# Patient Record
Sex: FEMALE | Race: WHITE | NOT HISPANIC OR LATINO | Employment: UNEMPLOYED | URBAN - METROPOLITAN AREA
[De-identification: names, ages, dates, MRNs, and addresses within clinical notes are randomized per-mention and may not be internally consistent; named-entity substitution may affect disease eponyms.]

---

## 2017-01-09 ENCOUNTER — APPOINTMENT (OUTPATIENT)
Dept: MEDICAL GROUP | Facility: MEDICAL CENTER | Age: 32
End: 2017-01-09
Payer: COMMERCIAL

## 2017-03-03 ENCOUNTER — OFFICE VISIT (OUTPATIENT)
Dept: MEDICAL GROUP | Facility: MEDICAL CENTER | Age: 32
End: 2017-03-03
Payer: COMMERCIAL

## 2017-03-03 VITALS
TEMPERATURE: 98.9 F | HEIGHT: 70 IN | DIASTOLIC BLOOD PRESSURE: 72 MMHG | BODY MASS INDEX: 24.79 KG/M2 | SYSTOLIC BLOOD PRESSURE: 114 MMHG | OXYGEN SATURATION: 98 % | HEART RATE: 78 BPM | RESPIRATION RATE: 16 BRPM | WEIGHT: 173.2 LBS

## 2017-03-03 DIAGNOSIS — J06.9 ACUTE URI: ICD-10-CM

## 2017-03-03 PROCEDURE — 99214 OFFICE O/P EST MOD 30 MIN: CPT | Performed by: PHYSICIAN ASSISTANT

## 2017-03-03 NOTE — ASSESSMENT & PLAN NOTE
This is a 31-year-old female complains of a four-day history of sinus congestion and drainage. Fever yesterday of 100.1. Associated green mucus from bilateral sinuses. Concerned about a sinus infection. One of her daughters has been sick. She has 3 children all under the age of 2-1/2. She has been taking over-the-counter DayQuil with some relief. Has ibuprofen 800 mg at home.
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
## 2017-03-23 DIAGNOSIS — J06.9 UPPER RESPIRATORY TRACT INFECTION, UNSPECIFIED TYPE: ICD-10-CM

## 2017-03-23 RX ORDER — AZITHROMYCIN 250 MG/1
TABLET, FILM COATED ORAL
Qty: 6 TAB | Refills: 0 | Status: SHIPPED | OUTPATIENT
Start: 2017-03-23 | End: 2017-05-18

## 2017-03-28 ENCOUNTER — HOSPITAL ENCOUNTER (OUTPATIENT)
Dept: LAB | Facility: MEDICAL CENTER | Age: 32
End: 2017-03-28
Attending: PHYSICIAN ASSISTANT
Payer: COMMERCIAL

## 2017-03-28 ENCOUNTER — OFFICE VISIT (OUTPATIENT)
Dept: MEDICAL GROUP | Facility: MEDICAL CENTER | Age: 32
End: 2017-03-28
Payer: COMMERCIAL

## 2017-03-28 VITALS
BODY MASS INDEX: 24.77 KG/M2 | DIASTOLIC BLOOD PRESSURE: 74 MMHG | RESPIRATION RATE: 16 BRPM | HEART RATE: 87 BPM | OXYGEN SATURATION: 96 % | WEIGHT: 173 LBS | HEIGHT: 70 IN | TEMPERATURE: 98.2 F | SYSTOLIC BLOOD PRESSURE: 120 MMHG

## 2017-03-28 DIAGNOSIS — R23.3 EASY BRUISING: ICD-10-CM

## 2017-03-28 DIAGNOSIS — R53.83 FATIGUE, UNSPECIFIED TYPE: ICD-10-CM

## 2017-03-28 DIAGNOSIS — R42 DIZZINESS: ICD-10-CM

## 2017-03-28 LAB
25(OH)D3 SERPL-MCNC: 16 NG/ML (ref 30–100)
ALBUMIN SERPL BCP-MCNC: 4.5 G/DL (ref 3.2–4.9)
ALBUMIN/GLOB SERPL: 1.6 G/DL
ALP SERPL-CCNC: 88 U/L (ref 30–99)
ALT SERPL-CCNC: 7 U/L (ref 2–50)
ANION GAP SERPL CALC-SCNC: 6 MMOL/L (ref 0–11.9)
APPEARANCE UR: CLEAR
AST SERPL-CCNC: 16 U/L (ref 12–45)
BASOPHILS # BLD AUTO: 0.4 % (ref 0–1.8)
BASOPHILS # BLD: 0.03 K/UL (ref 0–0.12)
BILIRUB SERPL-MCNC: 0.3 MG/DL (ref 0.1–1.5)
BILIRUB UR QL STRIP.AUTO: NEGATIVE
BUN SERPL-MCNC: 20 MG/DL (ref 8–22)
CALCIUM SERPL-MCNC: 9.1 MG/DL (ref 8.4–10.2)
CHLORIDE SERPL-SCNC: 105 MMOL/L (ref 96–112)
CO2 SERPL-SCNC: 25 MMOL/L (ref 20–33)
COLOR UR: YELLOW
CREAT SERPL-MCNC: 0.69 MG/DL (ref 0.5–1.4)
CULTURE IF INDICATED INDCX: NO UA CULTURE
EOSINOPHIL # BLD AUTO: 0.08 K/UL (ref 0–0.51)
EOSINOPHIL NFR BLD: 1.2 % (ref 0–6.9)
ERYTHROCYTE [DISTWIDTH] IN BLOOD BY AUTOMATED COUNT: 42.5 FL (ref 35.9–50)
FERRITIN SERPL-MCNC: 27.7 NG/ML (ref 10–291)
FOLATE SERPL-MCNC: 17 NG/ML
GFR SERPL CREATININE-BSD FRML MDRD: >60 ML/MIN/1.73 M 2
GLOBULIN SER CALC-MCNC: 2.8 G/DL (ref 1.9–3.5)
GLUCOSE SERPL-MCNC: 92 MG/DL (ref 65–99)
GLUCOSE UR STRIP.AUTO-MCNC: NEGATIVE MG/DL
HCT VFR BLD AUTO: 42.9 % (ref 37–47)
HGB BLD-MCNC: 14.5 G/DL (ref 12–16)
HGB RETIC QN AUTO: 36.4 PG/CELL (ref 29–35)
IMM GRANULOCYTES # BLD AUTO: 0.02 K/UL (ref 0–0.11)
IMM GRANULOCYTES NFR BLD AUTO: 0.3 % (ref 0–0.9)
IMM RETICS NFR: 4 % (ref 9.3–17.4)
IRON SERPL-MCNC: 35 UG/DL (ref 40–170)
KETONES UR STRIP.AUTO-MCNC: NEGATIVE MG/DL
LEUKOCYTE ESTERASE UR QL STRIP.AUTO: NEGATIVE
LYMPHOCYTES # BLD AUTO: 2.59 K/UL (ref 1–4.8)
LYMPHOCYTES NFR BLD: 38.7 % (ref 22–41)
MCH RBC QN AUTO: 29.8 PG (ref 27–33)
MCHC RBC AUTO-ENTMCNC: 33.8 G/DL (ref 33.6–35)
MCV RBC AUTO: 88.1 FL (ref 81.4–97.8)
MICRO URNS: NORMAL
MONOCYTES # BLD AUTO: 0.52 K/UL (ref 0–0.85)
MONOCYTES NFR BLD AUTO: 7.8 % (ref 0–13.4)
NEUTROPHILS # BLD AUTO: 3.46 K/UL (ref 2–7.15)
NEUTROPHILS NFR BLD: 51.6 % (ref 44–72)
NITRITE UR QL STRIP.AUTO: NEGATIVE
NRBC # BLD AUTO: 0 K/UL
NRBC BLD AUTO-RTO: 0 /100 WBC
PH UR STRIP.AUTO: 5 [PH]
PLATELET # BLD AUTO: 238 K/UL (ref 164–446)
PMV BLD AUTO: 10.8 FL (ref 9–12.9)
POTASSIUM SERPL-SCNC: 3.6 MMOL/L (ref 3.6–5.5)
PROT SERPL-MCNC: 7.3 G/DL (ref 6–8.2)
PROT UR QL STRIP: NEGATIVE MG/DL
RBC # BLD AUTO: 4.87 M/UL (ref 4.2–5.4)
RBC UR QL AUTO: NEGATIVE
RETICS # AUTO: 0.08 M/UL (ref 0.04–0.06)
RETICS/RBC NFR: 1.7 % (ref 0.8–2.1)
SODIUM SERPL-SCNC: 136 MMOL/L (ref 135–145)
SP GR UR STRIP.AUTO: 1.02
TSH SERPL DL<=0.005 MIU/L-ACNC: 0.61 UIU/ML (ref 0.35–5.5)
VIT B12 SERPL-MCNC: 363 PG/ML (ref 211–911)
WBC # BLD AUTO: 6.7 K/UL (ref 4.8–10.8)

## 2017-03-28 PROCEDURE — 36415 COLL VENOUS BLD VENIPUNCTURE: CPT

## 2017-03-28 PROCEDURE — 84443 ASSAY THYROID STIM HORMONE: CPT

## 2017-03-28 PROCEDURE — 82607 VITAMIN B-12: CPT

## 2017-03-28 PROCEDURE — 83540 ASSAY OF IRON: CPT

## 2017-03-28 PROCEDURE — 85025 COMPLETE CBC W/AUTO DIFF WBC: CPT

## 2017-03-28 PROCEDURE — 82306 VITAMIN D 25 HYDROXY: CPT

## 2017-03-28 PROCEDURE — 80053 COMPREHEN METABOLIC PANEL: CPT

## 2017-03-28 PROCEDURE — 82746 ASSAY OF FOLIC ACID SERUM: CPT

## 2017-03-28 PROCEDURE — 82728 ASSAY OF FERRITIN: CPT

## 2017-03-28 PROCEDURE — 85046 RETICYTE/HGB CONCENTRATE: CPT

## 2017-03-28 PROCEDURE — 99214 OFFICE O/P EST MOD 30 MIN: CPT | Performed by: PHYSICIAN ASSISTANT

## 2017-03-28 PROCEDURE — 81003 URINALYSIS AUTO W/O SCOPE: CPT

## 2017-03-28 NOTE — PROGRESS NOTES
Chief Complaint   Patient presents with   • Dizziness     2x weeks       HISTORY OF PRESENT ILLNESS: Patient is a 31 y.o. female established patient who presents today to discuss dizziness    Dizziness  Patient states that over the last 2 weeks positive dizziness. Patient states that she has gotten lightheaded. Patient states that she has not had any loss of consciousness from this. Denies any trauma. Patient states that she had one episode that she specifically can recall when she was walking down the dodd that it felt like the ground was moving from beneath the patient which caused her to lose balance. The patient states no short of breath. Positive fatigue. Patient is exercising. Positive good diet and eating healthy. Patient admits that she is currently breast-feeding. Patient also states she has done urine pregnancy test which have been negative.       Patient Active Problem List    Diagnosis Date Noted   • Dizziness 03/28/2017   • Acute URI 03/03/2017   • Routine general medical examination at a health care facility 12/06/2016   • Nevus 12/06/2016   • Anxiety 12/06/2016       Allergies:Review of patient's allergies indicates no known allergies.    Current Outpatient Prescriptions   Medication Sig Dispense Refill   • ibuprofen (MOTRIN) 800 MG Tab Take 1 Tab by mouth 2 times a day as needed. 60 Tab 2   • hydrocodone-acetaminophen (NORCO) 5-325 MG Tab per tablet Take 1 Tab by mouth every 8 hours as needed. 90 Tab 0   • cyclobenzaprine (FLEXERIL) 10 MG Tab Take 1 Tab by mouth 2 times a day as needed. 60 Tab 2   • azithromycin (ZITHROMAX) 250 MG Tab 2 by mouth day 1, 1 by mouth day 2 through 5 6 Tab 0     No current facility-administered medications for this visit.       Social History   Substance Use Topics   • Smoking status: Never Smoker    • Smokeless tobacco: Never Used   • Alcohol Use: 0.0 oz/week     0 Standard drinks or equivalent per week      Comment: hardly anything       Family Status   Relation Status  "Death Age   • Mother Alive    • Father Alive    • Sister Alive    • Paternal Grandmother     • Maternal Grandmother Alive    • Maternal Grandfather Alive    • Paternal Grandfather Alive    • Daughter Alive    • Daughter Alive    • Daughter Alive      Family History   Problem Relation Age of Onset   • Arthritis Father      RA   • Other Father      eczema   • Heart Disease Mother      arrhythmia   • Other Daughter      eczema   • Other Daughter      eczema       Review of Systems:   Constitutional: Negative for fever, chills, weight loss and malaise/fatigue.   HENT: Negative for ear pain, nosebleeds, congestion, sore throat and neck pain.    Eyes: Negative for blurred vision.   Respiratory: Negative for cough, sputum production, shortness of breath and wheezing.    Cardiovascular: Negative for chest pain, palpitations, orthopnea and leg swelling.   Gastrointestinal: Negative for heartburn, nausea, vomiting and abdominal pain.   Genitourinary: Negative for dysuria, urgency and frequency.   Musculoskeletal: Negative for myalgias, back pain and joint pain.   Skin: Negative for rash and itching.   Neurological: Positive for dizziness. Negative tingling, tremors, sensory change, focal weakness and headaches.   Endo/Heme/Allergies: Patient does mention that she is bruise/bleed easily.   Psychiatric/Behavioral: Negative for depression, suicidal ideas and memory loss.  The patient is not nervous/anxious and does not have insomnia.    All other systems reviewed and are negative except as in HPI.    Exam:  Blood pressure 120/74, pulse 87, temperature 36.8 °C (98.2 °F), resp. rate 16, height 1.778 m (5' 10\"), weight 78.472 kg (173 lb), SpO2 96 %, currently breastfeeding.  General:  Well nourished, well developed female in NAD  Head: is grossly normal.  HEENT: Eyes conjunctiva is clear, lids without ptosis, pupils equal round and reactive to light and accommodation.  No nystagmus. Ears normal shape and contour, canals are " clear bilaterally, TMs with good light reflex and appear normal.  Nasal mucosa pale and edematous with clear rhinorrhea.  Oropharynx benign.  Sinuses (frontal and maxillary) nontender to palpation.   Neck: Supple without JVD or bruit. Thyroid is not enlarged.  Pulmonary: Clear to ausculation. Normal effort. No rales, ronchi, or wheezing.  Cardiovascular: Regular rate and rhythm without murmur. Carotid and radial pulses are intact and equal bilaterally.  Extremities: no clubbing, cyanosis, or edema.  Skin: Positive bruising noted to patient's lower extremity very stages.    Medical decision-making and discussion: Differential diagnosis discussed with patient at this present time are to check labs were overall health status, rule out diabetes, thyroid, anemia, etc.    Please note that this dictation was created using voice recognition software. I have made every reasonable attempt to correct obvious errors, but I expect that there are errors of grammar and possibly content that I did not discover before finalizing the note.    Assessment/Plan:  1. Dizziness  CBC WITH DIFFERENTIAL    COMP METABOLIC PANEL    TSH    URINALYSIS,CULTURE IF INDICATED    VITAMIN B12    VITAMIN D,25 HYDROXY    GLUCOSE TOLERANCE TEST (EA ADD)    FOLATE    RETICULOCYTES COUNT    IRON    FERRITIN   2. Fatigue, unspecified type  CBC WITH DIFFERENTIAL    COMP METABOLIC PANEL    TSH    URINALYSIS,CULTURE IF INDICATED    VITAMIN B12    VITAMIN D,25 HYDROXY    GLUCOSE TOLERANCE TEST (EA ADD)    FOLATE    RETICULOCYTES COUNT    IRON    FERRITIN   3. Easy bruising  CBC WITH DIFFERENTIAL    COMP METABOLIC PANEL    TSH    URINALYSIS,CULTURE IF INDICATED    VITAMIN B12    VITAMIN D,25 HYDROXY    GLUCOSE TOLERANCE TEST (EA ADD)    FOLATE    RETICULOCYTES COUNT    IRON    FERRITIN

## 2017-03-28 NOTE — MR AVS SNAPSHOT
"        Preethi Jasso   3/28/2017 2:40 PM   Office Visit   MRN: [id number]    Department:  [place] [address]   Dept Phone:  [phone]    Description:  Female : 1985   Provider:  Jason Bobby PA-C           Reason for Visit     Dizziness 2x weeks      Allergies as of 3/28/2017     No Known Allergies      You were diagnosed with     Dizziness   [216937]       Fatigue, unspecified type   [9711699]       Easy bruising   [382420]         Vital Signs     Blood Pressure Pulse Temperature Respirations Height Weight    120/74 mmHg 87 36.8 °C (98.2 °F) 16 1.778 m (5' 10\") 78.472 kg (173 lb)    Body Mass Index Oxygen Saturation Breastfeeding? Smoking Status          24.82 kg/m2 96% Yes Never Smoker         Basic Information     Date Of Birth Sex Race Ethnicity Preferred Language    1985 Female White Non- English      Problem List              ICD-10-CM Priority Class Noted - Resolved    Routine general medical examination at a health care facility Z00.00   2016 - Present    Nevus D22.9   2016 - Present    Anxiety F41.9   2016 - Present    Acute URI J06.9   3/3/2017 - Present    Dizziness R42   3/28/2017 - Present      Health Maintenance        Date Due Completion Dates    PAP SMEAR 2008 ---    IMM INFLUENZA (1) 3/3/2018 (Originally 2016) ---    IMM DTaP/Tdap/Td Vaccine (2 - Td) 2026            Current Immunizations     Tdap Vaccine 2016      Below and/or attached are the medications your provider expects you to take. Review all of your home medications and newly ordered medications with your provider and/or pharmacist. Follow medication instructions as directed by your provider and/or pharmacist. Please keep your medication list with you and share with your provider. Update the information when medications are discontinued, doses are changed, or new medications (including over-the-counter products) are added; and carry medication information at all " times in the event of emergency situations     Allergies:  No Known Allergies          Medications  Valid as of: March 28, 2017 -  3:15 PM    Generic Name Brand Name Tablet Size Instructions for use    Azithromycin (Tab) ZITHROMAX 250 MG 2 by mouth day 1, 1 by mouth day 2 through 5        Cyclobenzaprine HCl (Tab) FLEXERIL 10 MG Take 1 Tab by mouth 2 times a day as needed.        Hydrocodone-Acetaminophen (Tab) NORCO 5-325 MG Take 1 Tab by mouth every 8 hours as needed.        Ibuprofen (Tab) MOTRIN 800 MG Take 1 Tab by mouth 2 times a day as needed.        .                 Medicines prescribed today were sent to:     [id number] - [place], [place] - [address]    [address] [place] [address]    Phone: [phone] Fax: [phone]    Open 24 Hours?: No      Medication refill instructions:       If your prescription bottle indicates you have medication refills left, it is not necessary to call your provider’s office. Please contact your pharmacy and they will refill your medication.    If your prescription bottle indicates you do not have any refills left, you may request refills at any time through one of the following ways: The online [employer] system (except Urgent Care), by calling your provider’s office, or by asking your pharmacy to contact your provider’s office with a refill request. Medication refills are processed only during regular business hours and may not be available until the next business day. Your provider may request additional information or to have a follow-up visit with you prior to refilling your medication.   *Please Note: Medication refills are assigned a new Rx number when refilled electronically. Your pharmacy may indicate that no refills were authorized even though a new prescription for the same medication is available at the pharmacy. Please request the medicine by name with the pharmacy before contacting your provider for a refill.        Your To Do List      Future Labs/Procedures Complete By Expires    CBC WITH DIFFERENTIAL  As directed 3/29/2018    COMP METABOLIC PANEL  As directed 3/29/2018    FERRITIN  As directed 3/28/2018    FOLATE  As directed 3/28/2018    GLUCOSE TOLERANCE TEST (EA ADD)  As directed 3/28/2018    IRON  As directed 3/28/2018    RETICULOCYTES COUNT  As directed 3/28/2018    TSH  As directed 3/29/2018    URINALYSIS,CULTURE IF INDICATED  As directed 3/29/2018    VITAMIN B12  As directed 3/29/2018    VITAMIN D,25 HYDROXY  As directed 3/29/2018         MyChart Access Code: Activation code not generated  Current [facility] Status: Active

## 2017-03-28 NOTE — ASSESSMENT & PLAN NOTE
Patient states that over the last 2 weeks positive dizziness. Patient states that she has gotten lightheaded. Patient states that she has not had any loss of consciousness from this. Denies any trauma. Patient states that she had one episode that she specifically can recall when she was walking down the dodd that it felt like the ground was moving from beneath the patient which caused her to lose balance. The patient states no short of breath. Positive fatigue. Patient is exercising. Positive good diet and eating healthy. Patient admits that she is currently breast-feeding. Patient also states she has done urine pregnancy test which have been negative.

## 2017-03-30 ENCOUNTER — TELEPHONE (OUTPATIENT)
Dept: MEDICAL GROUP | Facility: MEDICAL CENTER | Age: 32
End: 2017-03-30

## 2017-03-30 NOTE — TELEPHONE ENCOUNTER
----- Message from Jason Bobby PA-C sent at 3/29/2017  1:13 PM PDT -----  Review of labs: Please have patient schedule follow-up to discuss

## 2017-03-30 NOTE — TELEPHONE ENCOUNTER
Phone Number Called: [phone] (home)     Message: Patient scheduled for 04/03/2017    Left Message for patient to call back: N\A

## 2017-04-03 ENCOUNTER — OFFICE VISIT (OUTPATIENT)
Dept: MEDICAL GROUP | Facility: MEDICAL CENTER | Age: 32
End: 2017-04-03
Payer: COMMERCIAL

## 2017-04-03 VITALS
HEART RATE: 80 BPM | BODY MASS INDEX: 24.77 KG/M2 | OXYGEN SATURATION: 98 % | TEMPERATURE: 98.2 F | DIASTOLIC BLOOD PRESSURE: 84 MMHG | WEIGHT: 173 LBS | SYSTOLIC BLOOD PRESSURE: 120 MMHG | RESPIRATION RATE: 16 BRPM | HEIGHT: 70 IN

## 2017-04-03 DIAGNOSIS — E61.1 IRON DEFICIENCY: ICD-10-CM

## 2017-04-03 DIAGNOSIS — E55.9 VITAMIN D DEFICIENCY: ICD-10-CM

## 2017-04-03 DIAGNOSIS — R53.82 CHRONIC FATIGUE: ICD-10-CM

## 2017-04-03 DIAGNOSIS — F41.9 ANXIETY: ICD-10-CM

## 2017-04-03 PROCEDURE — 99214 OFFICE O/P EST MOD 30 MIN: CPT | Performed by: PHYSICIAN ASSISTANT

## 2017-04-03 RX ORDER — ERGOCALCIFEROL 1.25 MG/1
50000 CAPSULE ORAL
Qty: 20 CAP | Refills: 0 | Status: SHIPPED | OUTPATIENT
Start: 2017-04-03 | End: 2017-05-23

## 2017-04-03 RX ORDER — ESCITALOPRAM OXALATE 5 MG/1
5 TABLET ORAL DAILY
Qty: 30 TAB | Refills: 0 | Status: SHIPPED | OUTPATIENT
Start: 2017-04-03 | End: 2017-04-24

## 2017-04-03 RX ORDER — CYANOCOBALAMIN 1000 UG/ML
1000 INJECTION, SOLUTION INTRAMUSCULAR; SUBCUTANEOUS
Status: DISCONTINUED | OUTPATIENT
Start: 2017-04-03 | End: 2017-07-27

## 2017-04-03 RX ADMIN — CYANOCOBALAMIN 1000 MCG: 1000 INJECTION, SOLUTION INTRAMUSCULAR; SUBCUTANEOUS at 16:03

## 2017-04-03 NOTE — PROGRESS NOTES
"Chief Complaint   Patient presents with   • Follow-Up       HISTORY OF PRESENT ILLNESS: Patient is a 31 y.o. female established patient who presents today to discuss fatigue    Chronic fatigue  Dizzy as well. Patient's dizziness is gotten slightly better. Patient did mention during previous encounter one single isolated episode in which in her house she did have a stumbling situation which she fell walking down the hallway. No loss of consciousness or head injury. Patient recently did have labs drawn March 28, 2017 and here today to go over them.      Anxiety  Chronic in nature. Patient has had symptoms greater than 2 years. Patient stated that the symptoms were heightened after the birth of her twins 3-year-old daughters. States that she feels on edge. Patient admits that \"fuses very short.\" The patient finds herself yelling and having emotional outbursts. Patient states no homicidal or suicidal ideations. Patient states positive fatigue. Having difficulty with sleeping. No problems with appetite.         Patient Active Problem List    Diagnosis Date Noted   • Chronic fatigue 04/03/2017   • Dizziness 03/28/2017   • Acute URI 03/03/2017   • Routine general medical examination at a health care facility 12/06/2016   • Nevus 12/06/2016   • Anxiety 12/06/2016       Allergies:Review of patient's allergies indicates no known allergies.    Current Outpatient Prescriptions   Medication Sig Dispense Refill   • vitamin D, Ergocalciferol, (DRISDOL) 51542 UNITS Cap capsule Take 1 Cap by mouth every 7 days for 8 doses. 20 Cap 0   • escitalopram (LEXAPRO) 5 MG tablet Take 1 Tab by mouth every day. 30 Tab 0   • ibuprofen (MOTRIN) 800 MG Tab Take 1 Tab by mouth 2 times a day as needed. 60 Tab 2   • hydrocodone-acetaminophen (NORCO) 5-325 MG Tab per tablet Take 1 Tab by mouth every 8 hours as needed. 90 Tab 0   • cyclobenzaprine (FLEXERIL) 10 MG Tab Take 1 Tab by mouth 2 times a day as needed. 60 Tab 2   • azithromycin (ZITHROMAX) " 250 MG Tab 2 by mouth day 1, 1 by mouth day 2 through 5 6 Tab 0     Current Facility-Administered Medications   Medication Dose Route Frequency Provider Last Rate Last Dose   • cyanocobalamin (VITAMIN B-12) injection 1,000 mcg  1,000 mcg Intramuscular Q30 DAYS Jason Bobby PA-C           Social History   Substance Use Topics   • Smoking status: Never Smoker    • Smokeless tobacco: Never Used   • Alcohol Use: 0.0 oz/week     0 Standard drinks or equivalent per week      Comment: hardly anything       Family Status   Relation Status Death Age   • Mother Alive    • Father Alive    • Sister Alive    • Paternal Grandmother     • Maternal Grandmother Alive    • Maternal Grandfather Alive    • Paternal Grandfather Alive    • Daughter Alive    • Daughter Alive    • Daughter Alive      Family History   Problem Relation Age of Onset   • Arthritis Father      RA   • Other Father      eczema   • Heart Disease Mother      arrhythmia   • Other Daughter      eczema   • Other Daughter      eczema       Review of Systems:   Constitutional: Negative for fever, chills, weight loss and positive malaise/fatigue.   HENT: Negative for ear pain, nosebleeds, congestion, sore throat and neck pain.    Eyes: Negative for blurred vision.   Respiratory: Negative for cough, sputum production, shortness of breath and wheezing.    Cardiovascular: Negative for chest pain, palpitations, orthopnea and leg swelling.   Gastrointestinal: Negative for heartburn, nausea, vomiting and abdominal pain.   Genitourinary: Negative for dysuria, urgency and frequency.   Musculoskeletal: Negative for myalgias, back pain and joint pain.   Skin: Negative for rash and itching.   Neurological: Negative for dizziness, tingling, tremors, sensory change, focal weakness and headaches.   Endo/Heme/Allergies: Does not bruise/bleed easily.   Psychiatric/Behavioral: Negative for depression, suicidal ideas and memory loss.  The patient is not nervous/anxious and  "does not have insomnia.    All other systems reviewed and are negative except as in HPI.    Exam:  Blood pressure 120/84, pulse 80, temperature 36.8 °C (98.2 °F), resp. rate 16, height 1.778 m (5' 10\"), weight 78.472 kg (173 lb), SpO2 98 %, not currently breastfeeding.  General:  Well nourished, well developed female in NAD  Head: is grossly normal.  Neck: Supple without JVD or bruit. Thyroid is not enlarged.  Pulmonary: Clear to ausculation. Normal effort. No rales, ronchi, or wheezing.  Cardiovascular: Regular rate and rhythm without murmur. Carotid and radial pulses are intact and equal bilaterally.  Extremities: no clubbing, cyanosis, or edema.    Medical decision-making and discussion: I reviewed with patient her labs from March 28, 2017 which patient states she also saw. Positive vitamin D deficiency noted at 16 and iron deficiency noted at 35. She will resume using over-the-counter iron that she has left over from when she was pregnant with most recent child. Provided Imitrex 50,000 units once a week next 20 weeks. We will recheck levels at completion of her vitamin D. Extremities again we did discuss vitamin B12 supplementation and patient wishes to try intramuscular injection today    Also discussed with patient regarding anxiety/depression we discussed treatment options going to initiate Lexapro 5 mg once a day, and reevaluated in 2 weeks.    Please note that this dictation was created using voice recognition software. I have made every reasonable attempt to correct obvious errors, but I expect that there are errors of grammar and possibly content that I did not discover before finalizing the note.    Assessment/Plan:  1. Chronic fatigue  vitamin D, Ergocalciferol, (DRISDOL) 90363 UNITS Cap capsule    cyanocobalamin (VITAMIN B-12) injection 1,000 mcg    escitalopram (LEXAPRO) 5 MG tablet   2. Iron deficiency     3. Vitamin D deficiency  vitamin D, Ergocalciferol, (DRISDOL) 16059 UNITS Cap capsule   4. " Anxiety  escitalopram (LEXAPRO) 5 MG tablet

## 2017-04-03 NOTE — ASSESSMENT & PLAN NOTE
Dizzy as well. Patient's dizziness is gotten slightly better. Patient did mention during previous encounter one single isolated episode in which in her house she did have a stumbling situation which she fell walking down the hallway. No loss of consciousness or head injury. Patient recently did have labs drawn March 28, 2017 and here today to go over them.

## 2017-04-03 NOTE — ASSESSMENT & PLAN NOTE
"Chronic in nature. Patient has had symptoms greater than 2 years. Patient stated that the symptoms were heightened after the birth of her twins 3-year-old daughters. States that she feels on edge. Patient admits that \"fuses very short.\" The patient finds herself yelling and having emotional outbursts. Patient states no homicidal or suicidal ideations. Patient states positive fatigue. Having difficulty with sleeping. No problems with appetite.  "

## 2017-04-24 DIAGNOSIS — F41.9 ANXIETY: ICD-10-CM

## 2017-04-24 RX ORDER — ESCITALOPRAM OXALATE 10 MG/1
10 TABLET ORAL DAILY
Qty: 30 TAB | Refills: 2 | Status: SHIPPED | OUTPATIENT
Start: 2017-04-24 | End: 2017-07-27

## 2017-05-18 ENCOUNTER — OFFICE VISIT (OUTPATIENT)
Dept: MEDICAL GROUP | Facility: MEDICAL CENTER | Age: 32
End: 2017-05-18
Payer: COMMERCIAL

## 2017-05-18 VITALS
WEIGHT: 160.94 LBS | HEIGHT: 70 IN | HEART RATE: 86 BPM | BODY MASS INDEX: 23.04 KG/M2 | RESPIRATION RATE: 14 BRPM | TEMPERATURE: 98.2 F | DIASTOLIC BLOOD PRESSURE: 78 MMHG | SYSTOLIC BLOOD PRESSURE: 120 MMHG | OXYGEN SATURATION: 96 %

## 2017-05-18 DIAGNOSIS — R42 DIZZINESS: ICD-10-CM

## 2017-05-18 DIAGNOSIS — E55.9 VITAMIN D DEFICIENCY: ICD-10-CM

## 2017-05-18 DIAGNOSIS — E61.1 IRON DEFICIENCY: ICD-10-CM

## 2017-05-18 PROCEDURE — 99214 OFFICE O/P EST MOD 30 MIN: CPT | Performed by: PHYSICIAN ASSISTANT

## 2017-05-18 NOTE — PROGRESS NOTES
Chief Complaint   Patient presents with   • Follow-Up       HISTORY OF PRESENT ILLNESS: Patient is a 31 y.o. female established patient who presents today to discuss dizziness    Dizziness  Patient states this is been going on since mid March 2017. Patient states stable. Patient states that she has been doing more awareness regarding this symptom and notices that it seems to be more positional when she is going from a sitting to standing up position. Patient states no loss of consciousness. No head injury. Positive headaches. Patient states that she is eating healthy, dieting, exercising. Labs checked end of March, 2017 did show positive vitamin D deficiency and iron deficiency. Patient states she has been replenishing and taking medications as directed. Patient states no change. Patient states no double vision. Patient states that she does feel like she is spinning and the room around her is standing still.    Last eye exam December 2016    In discussion with patient regarding labs are ordered March she did not do glucose tolerance test       Patient Active Problem List    Diagnosis Date Noted   • Chronic fatigue 04/03/2017   • Dizziness 03/28/2017   • Acute URI 03/03/2017   • Routine general medical examination at a health care facility 12/06/2016   • Nevus 12/06/2016   • Anxiety 12/06/2016       Allergies:Review of patient's allergies indicates no known allergies.    Current Outpatient Prescriptions   Medication Sig Dispense Refill   • escitalopram (LEXAPRO) 10 MG Tab Take 1 Tab by mouth every day. 30 Tab 2   • vitamin D, Ergocalciferol, (DRISDOL) 62756 UNITS Cap capsule Take 1 Cap by mouth every 7 days for 8 doses. 20 Cap 0   • azithromycin (ZITHROMAX) 250 MG Tab 2 by mouth day 1, 1 by mouth day 2 through 5 6 Tab 0   • ibuprofen (MOTRIN) 800 MG Tab Take 1 Tab by mouth 2 times a day as needed. 60 Tab 2   • hydrocodone-acetaminophen (NORCO) 5-325 MG Tab per tablet Take 1 Tab by mouth every 8 hours as needed. 90  Tab 0   • cyclobenzaprine (FLEXERIL) 10 MG Tab Take 1 Tab by mouth 2 times a day as needed. 60 Tab 2     Current Facility-Administered Medications   Medication Dose Route Frequency Provider Last Rate Last Dose   • cyanocobalamin (VITAMIN B-12) injection 1,000 mcg  1,000 mcg Intramuscular Q30 DAYS Jason Bobby PA-C   1,000 mcg at 17 1603       Social History   Substance Use Topics   • Smoking status: Never Smoker    • Smokeless tobacco: Never Used   • Alcohol Use: 0.0 oz/week     0 Standard drinks or equivalent per week      Comment: hardly anything       Family Status   Relation Status Death Age   • Mother Alive    • Father Alive    • Sister Alive    • Paternal Grandmother     • Maternal Grandmother Alive    • Maternal Grandfather Alive    • Paternal Grandfather Alive    • Daughter Alive    • Daughter Alive    • Daughter Alive      Family History   Problem Relation Age of Onset   • Arthritis Father      RA   • Other Father      eczema   • Heart Disease Mother      arrhythmia   • Other Daughter      eczema   • Other Daughter      eczema       Review of Systems:   Constitutional: Negative for fever, chills, weight loss and malaise/fatigue.   HENT: Negative for ear pain, nosebleeds, congestion, sore throat and neck pain.    Eyes: Negative for blurred vision.   Respiratory: Negative for cough, sputum production, shortness of breath and wheezing.    Cardiovascular: Negative for chest pain, palpitations, orthopnea and leg swelling.   Gastrointestinal: Negative for heartburn, nausea, vomiting and abdominal pain.   Genitourinary: Negative for dysuria, urgency and frequency.   Musculoskeletal: Negative for myalgias, back pain and joint pain.   Skin: Negative for rash and itching.   Neurological: Positive for dizziness.  Negative tingling, tremors, sensory change, focal weakness and headaches.   Endo/Heme/Allergies: Does not bruise/bleed easily.   Psychiatric/Behavioral: Negative for depression, suicidal  "ideas and memory loss.  The patient is not nervous/anxious and does not have insomnia.    All other systems reviewed and are negative except as in HPI.    Exam:  Blood pressure 120/78, pulse 86, temperature 36.8 °C (98.2 °F), resp. rate 14, height 1.778 m (5' 10\"), weight 73 kg (160 lb 15 oz), SpO2 96 %, not currently breastfeeding.  General:  Well nourished, well developed female in NAD  Head: is grossly normal.  Neck: Supple without JVD or bruit. Thyroid is not enlarged.  Pulmonary: Clear to ausculation. Normal effort. No rales, ronchi, or wheezing.  Cardiovascular: Regular rate and rhythm without murmur. Carotid and radial pulses are intact and equal bilaterally.  Extremities: no clubbing, cyanosis, or edema.    Medical decision-making and discussion: A 31-year-old female presents to clinic today to follow up and to discuss dizziness that's been going on since mid March, 2017. Stable in nature. We have checked labs that were ordered and done back in March. We did also order glucose tolerance test which possibly may have caused or be causing patient's dizzy spells when she gets up. She will try to have that done within this week. She has taken her vitamin D supplementation and has been taking iron. We will check those levels to make sure they are therapeutic. Discussed that if these 3 labs are all normal we will then consider possible carotid ultrasound or echocardiogram or possible referral to specialist to work this up further    Please note that this dictation was created using voice recognition software. I have made every reasonable attempt to correct obvious errors, but I expect that there are errors of grammar and possibly content that I did not discover before finalizing the note.    Assessment/Plan:  1. Dizziness  IRON/TOTAL IRON BIND    VITAMIN D,25 HYDROXY    RETICULOCYTES COUNT   2. Iron deficiency  IRON/TOTAL IRON BIND    VITAMIN D,25 HYDROXY    RETICULOCYTES COUNT   3. Vitamin D deficiency  IRON/TOTAL " IRON BIND    VITAMIN D,25 HYDROXY    RETICULOCYTES COUNT

## 2017-05-18 NOTE — ASSESSMENT & PLAN NOTE
Patient states this is been going on since mid March 2017. Patient states stable. Patient states that she has been doing more awareness regarding this symptom and notices that it seems to be more positional when she is going from a sitting to standing up position. Patient states no loss of consciousness. No head injury. Positive headaches. Patient states that she is eating healthy, dieting, exercising. Labs checked end of March, 2017 did show positive vitamin D deficiency and iron deficiency. Patient states she has been replenishing and taking medications as directed. Patient states no change. Patient states no double vision. Patient states that she does feel like she is spinning and the room around her is standing still.    Last eye exam December 2016    In discussion with patient regarding labs are ordered March she did not do glucose tolerance test

## 2017-05-26 ENCOUNTER — HOSPITAL ENCOUNTER (OUTPATIENT)
Dept: LAB | Facility: MEDICAL CENTER | Age: 32
End: 2017-05-26
Attending: PHYSICIAN ASSISTANT
Payer: COMMERCIAL

## 2017-05-26 DIAGNOSIS — E61.1 IRON DEFICIENCY: ICD-10-CM

## 2017-05-26 DIAGNOSIS — R53.83 FATIGUE, UNSPECIFIED TYPE: ICD-10-CM

## 2017-05-26 DIAGNOSIS — E55.9 VITAMIN D DEFICIENCY: ICD-10-CM

## 2017-05-26 DIAGNOSIS — R23.3 EASY BRUISING: ICD-10-CM

## 2017-05-26 DIAGNOSIS — R42 DIZZINESS: ICD-10-CM

## 2017-05-26 LAB
25(OH)D3 SERPL-MCNC: 53 NG/ML (ref 30–100)
HGB RETIC QN AUTO: 34.2 PG/CELL (ref 29–35)
IMM RETICS NFR: 6.8 % (ref 9.3–17.4)
IRON SATN MFR SERPL: 45 % (ref 15–55)
IRON SERPL-MCNC: 128 UG/DL (ref 40–170)
RETICS # AUTO: 0.09 M/UL (ref 0.04–0.06)
RETICS/RBC NFR: 1.9 % (ref 0.8–2.1)
TIBC SERPL-MCNC: 287 UG/DL (ref 250–450)

## 2017-05-26 PROCEDURE — 85046 RETICYTE/HGB CONCENTRATE: CPT

## 2017-05-26 PROCEDURE — 36415 COLL VENOUS BLD VENIPUNCTURE: CPT

## 2017-05-26 PROCEDURE — 83540 ASSAY OF IRON: CPT

## 2017-05-26 PROCEDURE — 83550 IRON BINDING TEST: CPT

## 2017-05-26 PROCEDURE — 82306 VITAMIN D 25 HYDROXY: CPT

## 2017-05-30 ENCOUNTER — TELEPHONE (OUTPATIENT)
Dept: MEDICAL GROUP | Facility: MEDICAL CENTER | Age: 32
End: 2017-05-30

## 2017-05-30 NOTE — TELEPHONE ENCOUNTER
Phone Number Called: [phone] (home)     Message: left message for patient to call back for lab results.    Left Message for patient to call back: yes

## 2017-05-30 NOTE — TELEPHONE ENCOUNTER
----- Message from Jason Bobby PA-C sent at 5/30/2017  6:50 AM PDT -----  Vitamin D is back to normal as is iron

## 2017-06-01 NOTE — TELEPHONE ENCOUNTER
Phone Number Called: [phone]    Message: Left message for patient about lab results.     Left Message for patient to call back: yes

## 2017-06-02 DIAGNOSIS — G89.4 CHRONIC PAIN SYNDROME: ICD-10-CM

## 2017-06-02 RX ORDER — HYDROCODONE BITARTRATE AND ACETAMINOPHEN 5; 325 MG/1; MG/1
1 TABLET ORAL EVERY 8 HOURS PRN
Qty: 90 TAB | Refills: 0 | Status: SHIPPED | OUTPATIENT
Start: 2017-06-02 | End: 2017-07-27

## 2017-06-27 DIAGNOSIS — F41.9 ANXIETY: ICD-10-CM

## 2017-06-27 RX ORDER — ALPRAZOLAM 0.5 MG/1
0.5 TABLET ORAL 2 TIMES DAILY PRN
Qty: 60 TAB | Refills: 0 | Status: SHIPPED | OUTPATIENT
Start: 2017-06-27 | End: 2017-07-27

## 2017-06-27 RX ORDER — CYANOCOBALAMIN 1000 UG/ML
1000 INJECTION, SOLUTION INTRAMUSCULAR; SUBCUTANEOUS
Qty: 1 VIAL | Refills: 11 | Status: SHIPPED | OUTPATIENT
Start: 2017-06-27 | End: 2017-07-27

## 2017-07-27 ENCOUNTER — OFFICE VISIT (OUTPATIENT)
Dept: MEDICAL GROUP | Facility: MEDICAL CENTER | Age: 32
End: 2017-07-27
Payer: COMMERCIAL

## 2017-07-27 VITALS
SYSTOLIC BLOOD PRESSURE: 122 MMHG | BODY MASS INDEX: 23.32 KG/M2 | HEART RATE: 71 BPM | OXYGEN SATURATION: 96 % | HEIGHT: 70 IN | DIASTOLIC BLOOD PRESSURE: 74 MMHG | WEIGHT: 162.92 LBS | TEMPERATURE: 98.6 F

## 2017-07-27 DIAGNOSIS — Z76.89 ENCOUNTER TO ESTABLISH CARE: ICD-10-CM

## 2017-07-27 DIAGNOSIS — Z3A.08 8 WEEKS GESTATION OF PREGNANCY: ICD-10-CM

## 2017-07-27 DIAGNOSIS — R42 DIZZINESS: ICD-10-CM

## 2017-07-27 DIAGNOSIS — F41.9 ANXIETY: ICD-10-CM

## 2017-07-27 PROBLEM — J06.9 ACUTE URI: Status: RESOLVED | Noted: 2017-03-03 | Resolved: 2017-07-27

## 2017-07-27 PROCEDURE — 99214 OFFICE O/P EST MOD 30 MIN: CPT | Performed by: PHYSICIAN ASSISTANT
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
## 2017-07-27 NOTE — MR AVS SNAPSHOT
"        Preethi Jasso   2017 4:20 PM   Office Visit   MRN: [id number]    Department:  [place] [address]   Dept Phone:  [phone]    Description:  Female : 1985   Provider:  Marques Mike PA-C           Reason for Visit     Dizziness           Allergies as of 2017     No Known Allergies      You were diagnosed with     8 weeks gestation of pregnancy   [854506]       Anxiety   [647682]       Dizziness   [371133]       Encounter to establish care   [[id number]]         Vital Signs     Blood Pressure Pulse Temperature Height Weight Body Mass Index    122/74 mmHg 71 37 °C (98.6 °F) 1.778 m (5' 10\") 73.9 kg (162 lb 14.7 oz) 23.38 kg/m2    Oxygen Saturation Smoking Status                96% Never Smoker           Basic Information     Date Of Birth Sex Race Ethnicity Preferred Language    1985 Female White Non- English      Your appointments     Aug 24, 2017  7:20 AM   NEW TO YOU with Preethi Bailey M.D.   [facility] (--)    [address] [address] [id number] [phone]              Problem List              ICD-10-CM Priority Class Noted - Resolved    Routine general medical examination at a health care facility Z00.00   2016 - Present    Nevus D22.9   2016 - Present    Anxiety F41.9   2016 - Present    Dizziness R42   3/28/2017 - Present    Chronic fatigue R53.82   4/3/2017 - Present    8 weeks gestation of pregnancy Z3A.08   2017 - Present      Health Maintenance        Date Due Completion Dates    PAP SMEAR 2008 ---    IMM INFLUENZA (1) 3/3/2018 (Originally 2017) ---    IMM DTaP/Tdap/Td Vaccine (2 - Td) 2026            Current Immunizations     Tdap Vaccine 2016      Below and/or attached are the medications your provider expects you to take. Review all of your home medications and newly ordered medications with your provider and/or pharmacist. Follow medication " instructions as directed by your provider and/or pharmacist. Please keep your medication list with you and share with your provider. Update the information when medications are discontinued, doses are changed, or new medications (including over-the-counter products) are added; and carry medication information at all times in the event of emergency situations     Allergies:  No Known Allergies          Medications  Valid as of: July 27, 2017 -  5:10 PM    Generic Name Brand Name Tablet Size Instructions for use    .                 Medicines prescribed today were sent to:     [facility] #[id number] - [place], [place] - [address]    [address] [place] [address]    Phone: [phone] Fax: [phone]    Open 24 Hours?: No      Medication refill instructions:       If your prescription bottle indicates you have medication refills left, it is not necessary to call your provider’s office. Please contact your pharmacy and they will refill your medication.    If your prescription bottle indicates you do not have any refills left, you may request refills at any time through one of the following ways: The online [employer] system (except Urgent Care), by calling your provider’s office, or by asking your pharmacy to contact your provider’s office with a refill request. Medication refills are processed only during regular business hours and may not be available until the next business day. Your provider may request additional information or to have a follow-up visit with you prior to refilling your medication.   *Please Note: Medication refills are assigned a new Rx number when refilled electronically. Your pharmacy may indicate that no refills were authorized even though a new prescription for the same medication is available at the pharmacy. Please request the medicine by name with the pharmacy before contacting your provider for a refill.           [employer] Access Code: Activation code not  generated  Current MyChart Status: Active

## 2017-07-27 NOTE — ASSESSMENT & PLAN NOTE
This is a 31-year-old female who is here today to discuss some dizziness. She is 8 weeks pregnant. She has an appointment next week with obstetrics. She has some nauseousness. This is her third pregnancy. This will be her fourth child. This is her first pregnancy naturally.

## 2017-07-27 NOTE — ASSESSMENT & PLAN NOTE
Since March she will have episodes of dizziness when getting up from a seated position. Dizziness last for only a few seconds. She denies any significant headaches. No shortness of breath or chest pain.

## 2017-07-27 NOTE — ASSESSMENT & PLAN NOTE
She was on Lexapro but stopped it in early July after realizing medication might be harmful to the fetus. She will control her anxiety in other ways. Denies any significant anxiety today. She does have twins who are 3 and a 16-month-old.

## 2018-02-15 ENCOUNTER — HOSPITAL ENCOUNTER (OUTPATIENT)
Dept: HOSPITAL 8 - LDIP | Age: 33
Setting detail: OBSERVATION
Discharge: HOME | End: 2018-02-15
Attending: OBSTETRICS & GYNECOLOGY | Admitting: OBSTETRICS & GYNECOLOGY
Payer: COMMERCIAL

## 2018-02-15 VITALS — BODY MASS INDEX: 29.32 KG/M2 | HEIGHT: 71 IN | WEIGHT: 209.44 LBS

## 2018-02-15 DIAGNOSIS — O99.513: Primary | ICD-10-CM

## 2018-02-15 DIAGNOSIS — Z3A.36: ICD-10-CM

## 2018-02-15 PROCEDURE — G0378 HOSPITAL OBSERVATION PER HR: HCPCS

## 2018-02-15 PROCEDURE — 59025 FETAL NON-STRESS TEST: CPT

## 2018-02-15 PROCEDURE — 59412 ANTEPARTUM MANIPULATION: CPT

## 2018-03-04 ENCOUNTER — HOSPITAL ENCOUNTER (OUTPATIENT)
Dept: HOSPITAL 8 - LDOP | Age: 33
Discharge: HOME | End: 2018-03-04
Attending: OBSTETRICS & GYNECOLOGY
Payer: COMMERCIAL

## 2018-03-04 VITALS — DIASTOLIC BLOOD PRESSURE: 65 MMHG | SYSTOLIC BLOOD PRESSURE: 118 MMHG

## 2018-03-04 VITALS — BODY MASS INDEX: 30.03 KG/M2 | WEIGHT: 214.51 LBS | HEIGHT: 71 IN

## 2018-03-04 DIAGNOSIS — Z3A.39: ICD-10-CM

## 2018-03-04 DIAGNOSIS — O36.8130: Primary | ICD-10-CM

## 2018-03-04 PROCEDURE — 99211 OFF/OP EST MAY X REQ PHY/QHP: CPT

## 2018-03-04 PROCEDURE — 59025 FETAL NON-STRESS TEST: CPT

## 2018-03-04 PROCEDURE — G0463 HOSPITAL OUTPT CLINIC VISIT: HCPCS

## 2018-03-08 ENCOUNTER — HOSPITAL ENCOUNTER (INPATIENT)
Dept: HOSPITAL 8 - LDIP | Age: 33
LOS: 2 days | Discharge: HOME | End: 2018-03-10
Attending: OBSTETRICS & GYNECOLOGY | Admitting: OBSTETRICS & GYNECOLOGY
Payer: COMMERCIAL

## 2018-03-08 VITALS — SYSTOLIC BLOOD PRESSURE: 121 MMHG | DIASTOLIC BLOOD PRESSURE: 75 MMHG

## 2018-03-08 VITALS — WEIGHT: 218.48 LBS | BODY MASS INDEX: 30.59 KG/M2 | HEIGHT: 71 IN

## 2018-03-08 VITALS — DIASTOLIC BLOOD PRESSURE: 72 MMHG | SYSTOLIC BLOOD PRESSURE: 119 MMHG

## 2018-03-08 DIAGNOSIS — J45.909: ICD-10-CM

## 2018-03-08 DIAGNOSIS — O32.0XX0: ICD-10-CM

## 2018-03-08 DIAGNOSIS — Z3A.39: ICD-10-CM

## 2018-03-08 DIAGNOSIS — O34.219: ICD-10-CM

## 2018-03-08 LAB
BASOPHILS # BLD AUTO: 0.01 X10^3/UL (ref 0–0.1)
BASOPHILS NFR BLD AUTO: 0 % (ref 0–1)
EOSINOPHIL # BLD AUTO: 0.03 X10^3/UL (ref 0–0.4)
EOSINOPHIL NFR BLD AUTO: 0 % (ref 1–7)
ERYTHROCYTE [DISTWIDTH] IN BLOOD BY AUTOMATED COUNT: 13.8 % (ref 9.6–15.2)
LYMPHOCYTES # BLD AUTO: 1.52 X10^3/UL (ref 1–3.4)
LYMPHOCYTES NFR BLD AUTO: 17 % (ref 22–44)
MCH RBC QN AUTO: 31.2 PG (ref 27–34.8)
MCHC RBC AUTO-ENTMCNC: 34.6 G/DL (ref 32.4–35.8)
MCV RBC AUTO: 90.3 FL (ref 80–100)
MD: NO
MONOCYTES # BLD AUTO: 0.87 X10^3/UL (ref 0.2–0.8)
MONOCYTES NFR BLD AUTO: 10 % (ref 2–9)
NEUTROPHILS # BLD AUTO: 6.77 X10^3/UL (ref 1.8–6.8)
NEUTROPHILS NFR BLD AUTO: 74 % (ref 42–75)
PLATELET # BLD AUTO: 174 X10^3/UL (ref 130–400)
PMV BLD AUTO: 9.4 FL (ref 7.4–10.4)
RBC # BLD AUTO: 4.15 X10^6/UL (ref 3.82–5.3)

## 2018-03-08 PROCEDURE — 85025 COMPLETE CBC W/AUTO DIFF WBC: CPT

## 2018-03-08 PROCEDURE — 86850 RBC ANTIBODY SCREEN: CPT

## 2018-03-08 PROCEDURE — 36415 COLL VENOUS BLD VENIPUNCTURE: CPT

## 2018-03-08 PROCEDURE — 85461 HEMOGLOBIN FETAL: CPT

## 2018-03-08 PROCEDURE — 86900 BLOOD TYPING SEROLOGIC ABO: CPT

## 2018-03-08 RX ADMIN — KETOROLAC TROMETHAMINE SCH MG: 30 INJECTION, SOLUTION INTRAMUSCULAR at 22:18

## 2018-03-08 RX ADMIN — SODIUM CHLORIDE, SODIUM LACTATE, POTASSIUM CHLORIDE, AND CALCIUM CHLORIDE SCH MLS/HR: .6; .31; .03; .02 INJECTION, SOLUTION INTRAVENOUS at 15:30

## 2018-03-08 RX ADMIN — SODIUM CHLORIDE, SODIUM LACTATE, POTASSIUM CHLORIDE, AND CALCIUM CHLORIDE SCH MLS/HR: .6; .31; .03; .02 INJECTION, SOLUTION INTRAVENOUS at 05:30

## 2018-03-08 RX ADMIN — Medication SCH MLS/HR: at 15:47

## 2018-03-08 RX ADMIN — OXYCODONE HYDROCHLORIDE AND ACETAMINOPHEN PRN TAB: 5; 325 TABLET ORAL at 17:37

## 2018-03-08 RX ADMIN — SODIUM CHLORIDE, SODIUM LACTATE, POTASSIUM CHLORIDE, AND CALCIUM CHLORIDE SCH MLS/HR: .6; .31; .03; .02 INJECTION, SOLUTION INTRAVENOUS at 15:47

## 2018-03-08 RX ADMIN — OXYCODONE HYDROCHLORIDE AND ACETAMINOPHEN PRN TAB: 5; 325 TABLET ORAL at 22:18

## 2018-03-08 RX ADMIN — SODIUM CHLORIDE, SODIUM LACTATE, POTASSIUM CHLORIDE, AND CALCIUM CHLORIDE SCH MLS/HR: .6; .31; .03; .02 INJECTION, SOLUTION INTRAVENOUS at 23:47

## 2018-03-08 RX ADMIN — SODIUM CHLORIDE, SODIUM LACTATE, POTASSIUM CHLORIDE, CALCIUM CHLORIDE, AND DEXTROSE MONOHYDRATE SCH MLS/HR: 600; 310; 30; 20; 5 INJECTION, SOLUTION INTRAVENOUS at 14:28

## 2018-03-08 RX ADMIN — SODIUM CHLORIDE, SODIUM LACTATE, POTASSIUM CHLORIDE, AND CALCIUM CHLORIDE SCH MLS/HR: .6; .31; .03; .02 INJECTION, SOLUTION INTRAVENOUS at 15:10

## 2018-03-08 RX ADMIN — SODIUM CHLORIDE, SODIUM LACTATE, POTASSIUM CHLORIDE, AND CALCIUM CHLORIDE SCH MLS/HR: .6; .31; .03; .02 INJECTION, SOLUTION INTRAVENOUS at 09:46

## 2018-03-08 RX ADMIN — SODIUM CHLORIDE, SODIUM LACTATE, POTASSIUM CHLORIDE, AND CALCIUM CHLORIDE SCH MLS/HR: .6; .31; .03; .02 INJECTION, SOLUTION INTRAVENOUS at 23:30

## 2018-03-08 RX ADMIN — OXYCODONE HYDROCHLORIDE AND ACETAMINOPHEN PRN TAB: 5; 325 TABLET ORAL at 18:18

## 2018-03-08 RX ADMIN — SODIUM CHLORIDE, SODIUM LACTATE, POTASSIUM CHLORIDE, AND CALCIUM CHLORIDE SCH MLS/HR: .6; .31; .03; .02 INJECTION, SOLUTION INTRAVENOUS at 23:34

## 2018-03-08 RX ADMIN — Medication SCH MLS/HR: at 15:34

## 2018-03-08 RX ADMIN — KETOROLAC TROMETHAMINE SCH MG: 30 INJECTION, SOLUTION INTRAMUSCULAR at 22:00

## 2018-03-08 RX ADMIN — SODIUM CHLORIDE, SODIUM LACTATE, POTASSIUM CHLORIDE, AND CALCIUM CHLORIDE SCH MLS/HR: .6; .31; .03; .02 INJECTION, SOLUTION INTRAVENOUS at 15:34

## 2018-03-08 RX ADMIN — SODIUM CHLORIDE, SODIUM LACTATE, POTASSIUM CHLORIDE, CALCIUM CHLORIDE, AND DEXTROSE MONOHYDRATE SCH MLS/HR: 600; 310; 30; 20; 5 INJECTION, SOLUTION INTRAVENOUS at 22:28

## 2018-03-09 VITALS — DIASTOLIC BLOOD PRESSURE: 65 MMHG | SYSTOLIC BLOOD PRESSURE: 111 MMHG

## 2018-03-09 VITALS — DIASTOLIC BLOOD PRESSURE: 70 MMHG | SYSTOLIC BLOOD PRESSURE: 107 MMHG

## 2018-03-09 VITALS — SYSTOLIC BLOOD PRESSURE: 112 MMHG | DIASTOLIC BLOOD PRESSURE: 64 MMHG

## 2018-03-09 VITALS — SYSTOLIC BLOOD PRESSURE: 110 MMHG | DIASTOLIC BLOOD PRESSURE: 71 MMHG

## 2018-03-09 LAB
BASOPHILS # BLD AUTO: 0.01 X10^3/UL (ref 0–0.1)
BASOPHILS NFR BLD AUTO: 0 % (ref 0–1)
EOSINOPHIL # BLD AUTO: 0.01 X10^3/UL (ref 0–0.4)
EOSINOPHIL NFR BLD AUTO: 0 % (ref 1–7)
ERYTHROCYTE [DISTWIDTH] IN BLOOD BY AUTOMATED COUNT: 13.7 % (ref 9.6–15.2)
LYMPHOCYTES # BLD AUTO: 0.93 X10^3/UL (ref 1–3.4)
LYMPHOCYTES NFR BLD AUTO: 6 % (ref 22–44)
MCH RBC QN AUTO: 31.6 PG (ref 27–34.8)
MCHC RBC AUTO-ENTMCNC: 34.2 G/DL (ref 32.4–35.8)
MCV RBC AUTO: 92.3 FL (ref 80–100)
MD: (no result)
MONOCYTES # BLD AUTO: 0.69 X10^3/UL (ref 0.2–0.8)
MONOCYTES NFR BLD AUTO: 4 % (ref 2–9)
NEUTROPHILS # BLD AUTO: 14.87 X10^3/UL (ref 1.8–6.8)
NEUTROPHILS NFR BLD AUTO: 90 % (ref 42–75)
PLATELET # BLD AUTO: 127 X10^3/UL (ref 130–400)
PMV BLD AUTO: 9.3 FL (ref 7.4–10.4)
RBC # BLD AUTO: 3.61 X10^6/UL (ref 3.82–5.3)

## 2018-03-09 RX ADMIN — SODIUM CHLORIDE, SODIUM LACTATE, POTASSIUM CHLORIDE, AND CALCIUM CHLORIDE SCH MLS/HR: .6; .31; .03; .02 INJECTION, SOLUTION INTRAVENOUS at 07:34

## 2018-03-09 RX ADMIN — SIMETHICONE PRN MG: 80 TABLET, CHEWABLE ORAL at 21:00

## 2018-03-09 RX ADMIN — SODIUM CHLORIDE, SODIUM LACTATE, POTASSIUM CHLORIDE, AND CALCIUM CHLORIDE SCH MLS/HR: .6; .31; .03; .02 INJECTION, SOLUTION INTRAVENOUS at 07:47

## 2018-03-09 RX ADMIN — Medication SCH MLS/HR: at 01:47

## 2018-03-09 RX ADMIN — PRENATAL VIT W/ FE FUMARATE-FA TAB 27-0.8 MG SCH EACH: 27-0.8 TAB at 07:46

## 2018-03-09 RX ADMIN — SIMETHICONE PRN MG: 80 TABLET, CHEWABLE ORAL at 12:45

## 2018-03-09 RX ADMIN — IBUPROFEN PRN MG: 600 TABLET ORAL at 15:05

## 2018-03-09 RX ADMIN — SODIUM CHLORIDE, SODIUM LACTATE, POTASSIUM CHLORIDE, CALCIUM CHLORIDE, AND DEXTROSE MONOHYDRATE SCH MLS/HR: 600; 310; 30; 20; 5 INJECTION, SOLUTION INTRAVENOUS at 06:28

## 2018-03-09 RX ADMIN — OXYCODONE HYDROCHLORIDE AND ACETAMINOPHEN PRN TAB: 5; 325 TABLET ORAL at 21:00

## 2018-03-09 RX ADMIN — SODIUM CHLORIDE, SODIUM LACTATE, POTASSIUM CHLORIDE, AND CALCIUM CHLORIDE SCH MLS/HR: .6; .31; .03; .02 INJECTION, SOLUTION INTRAVENOUS at 07:30

## 2018-03-09 RX ADMIN — SIMETHICONE PRN MG: 80 TABLET, CHEWABLE ORAL at 17:05

## 2018-03-09 RX ADMIN — KETOROLAC TROMETHAMINE SCH MG: 30 INJECTION, SOLUTION INTRAMUSCULAR at 09:44

## 2018-03-09 RX ADMIN — OXYCODONE HYDROCHLORIDE AND ACETAMINOPHEN PRN TAB: 5; 325 TABLET ORAL at 02:25

## 2018-03-09 RX ADMIN — SIMETHICONE PRN MG: 80 TABLET, CHEWABLE ORAL at 07:46

## 2018-03-09 RX ADMIN — OXYCODONE HYDROCHLORIDE AND ACETAMINOPHEN PRN TAB: 5; 325 TABLET ORAL at 06:15

## 2018-03-09 RX ADMIN — IBUPROFEN PRN MG: 600 TABLET ORAL at 21:05

## 2018-03-09 RX ADMIN — SODIUM CHLORIDE, SODIUM LACTATE, POTASSIUM CHLORIDE, AND CALCIUM CHLORIDE SCH MLS/HR: .6; .31; .03; .02 INJECTION, SOLUTION INTRAVENOUS at 01:47

## 2018-03-09 RX ADMIN — OXYCODONE HYDROCHLORIDE AND ACETAMINOPHEN PRN TAB: 5; 325 TABLET ORAL at 17:05

## 2018-03-09 RX ADMIN — DOCUSATE SODIUM PRN MG: 100 CAPSULE, LIQUID FILLED ORAL at 21:00

## 2018-03-09 RX ADMIN — DOCUSATE SODIUM PRN MG: 100 CAPSULE, LIQUID FILLED ORAL at 07:46

## 2018-03-09 RX ADMIN — OXYCODONE HYDROCHLORIDE AND ACETAMINOPHEN PRN TAB: 5; 325 TABLET ORAL at 12:45

## 2018-03-09 RX ADMIN — Medication SCH MLS/HR: at 01:34

## 2018-03-09 RX ADMIN — KETOROLAC TROMETHAMINE SCH MG: 30 INJECTION, SOLUTION INTRAMUSCULAR at 04:19

## 2018-03-10 VITALS — SYSTOLIC BLOOD PRESSURE: 114 MMHG | DIASTOLIC BLOOD PRESSURE: 72 MMHG

## 2018-03-10 RX ADMIN — IBUPROFEN PRN MG: 600 TABLET ORAL at 03:27

## 2018-03-10 RX ADMIN — PRENATAL VIT W/ FE FUMARATE-FA TAB 27-0.8 MG SCH EACH: 27-0.8 TAB at 07:54

## 2018-03-10 RX ADMIN — OXYCODONE HYDROCHLORIDE AND ACETAMINOPHEN PRN TAB: 5; 325 TABLET ORAL at 00:56

## 2018-03-10 RX ADMIN — DOCUSATE SODIUM PRN MG: 100 CAPSULE, LIQUID FILLED ORAL at 07:54

## 2018-03-10 RX ADMIN — IBUPROFEN PRN MG: 600 TABLET ORAL at 09:58

## 2018-03-10 RX ADMIN — OXYCODONE HYDROCHLORIDE AND ACETAMINOPHEN PRN TAB: 5; 325 TABLET ORAL at 05:31

## 2018-03-10 RX ADMIN — SIMETHICONE PRN MG: 80 TABLET, CHEWABLE ORAL at 07:53

## 2018-03-10 RX ADMIN — OXYCODONE HYDROCHLORIDE AND ACETAMINOPHEN PRN TAB: 5; 325 TABLET ORAL at 09:58

## 2018-03-10 RX ADMIN — OXYCODONE HYDROCHLORIDE AND ACETAMINOPHEN PRN TAB: 5; 325 TABLET ORAL at 13:56

## 2018-03-10 RX ADMIN — SIMETHICONE PRN MG: 80 TABLET, CHEWABLE ORAL at 00:56

## 2018-03-12 DIAGNOSIS — E55.9 VITAMIN D DEFICIENCY: ICD-10-CM

## 2018-03-12 DIAGNOSIS — R53.82 CHRONIC FATIGUE: ICD-10-CM

## 2018-03-12 DIAGNOSIS — G89.4 CHRONIC PAIN SYNDROME: ICD-10-CM

## 2018-03-12 DIAGNOSIS — Z76.0 MEDICATION REFILL: ICD-10-CM

## 2018-03-12 RX ORDER — ERGOCALCIFEROL 1.25 MG/1
CAPSULE ORAL
Qty: 12 CAP | Refills: 0 | OUTPATIENT
Start: 2018-03-12

## 2018-03-12 RX ORDER — IBUPROFEN 800 MG/1
TABLET ORAL
Qty: 60 TAB | Refills: 0 | OUTPATIENT
Start: 2018-03-12

## 2018-03-12 RX ORDER — CYCLOBENZAPRINE HCL 10 MG
TABLET ORAL
Qty: 60 TAB | Refills: 0 | OUTPATIENT
Start: 2018-03-12

## 2018-04-12 ENCOUNTER — OFFICE VISIT (OUTPATIENT)
Dept: MEDICAL GROUP | Facility: MEDICAL CENTER | Age: 33
End: 2018-04-12
Payer: COMMERCIAL

## 2018-04-12 VITALS
DIASTOLIC BLOOD PRESSURE: 64 MMHG | HEART RATE: 90 BPM | SYSTOLIC BLOOD PRESSURE: 98 MMHG | BODY MASS INDEX: 27.92 KG/M2 | WEIGHT: 195 LBS | OXYGEN SATURATION: 96 % | HEIGHT: 70 IN | TEMPERATURE: 98.6 F

## 2018-04-12 DIAGNOSIS — G43.009 MIGRAINE WITHOUT AURA AND WITHOUT STATUS MIGRAINOSUS, NOT INTRACTABLE: ICD-10-CM

## 2018-04-12 DIAGNOSIS — R52 PAIN: ICD-10-CM

## 2018-04-12 DIAGNOSIS — G89.29 CHRONIC LOW BACK PAIN WITHOUT SCIATICA, UNSPECIFIED BACK PAIN LATERALITY: ICD-10-CM

## 2018-04-12 DIAGNOSIS — M54.50 CHRONIC LOW BACK PAIN WITHOUT SCIATICA, UNSPECIFIED BACK PAIN LATERALITY: ICD-10-CM

## 2018-04-12 PROBLEM — R42 DIZZINESS: Status: RESOLVED | Noted: 2017-03-28 | Resolved: 2018-04-12

## 2018-04-12 PROBLEM — Z3A.08 8 WEEKS GESTATION OF PREGNANCY: Status: RESOLVED | Noted: 2017-07-27 | Resolved: 2018-04-12

## 2018-04-12 PROBLEM — R53.82 CHRONIC FATIGUE: Status: RESOLVED | Noted: 2017-04-03 | Resolved: 2018-04-12

## 2018-04-12 PROCEDURE — 99214 OFFICE O/P EST MOD 30 MIN: CPT | Performed by: PHYSICIAN ASSISTANT

## 2018-04-12 RX ORDER — CYCLOBENZAPRINE HCL 10 MG
10 TABLET ORAL 3 TIMES DAILY PRN
COMMUNITY
End: 2018-04-12

## 2018-04-12 RX ORDER — HYDROCODONE BITARTRATE AND ACETAMINOPHEN 5; 325 MG/1; MG/1
1 TABLET ORAL EVERY 4 HOURS PRN
Qty: 20 TAB | Refills: 0 | Status: SHIPPED | OUTPATIENT
Start: 2018-04-12 | End: 2018-05-12

## 2018-04-12 RX ORDER — IBUPROFEN 200 MG
200 TABLET ORAL EVERY 6 HOURS PRN
COMMUNITY

## 2018-04-12 RX ORDER — CYCLOBENZAPRINE HCL 5 MG
5 TABLET ORAL 2 TIMES DAILY PRN
Qty: 30 TAB | Refills: 0 | Status: SHIPPED | OUTPATIENT
Start: 2018-04-12

## 2018-04-12 ASSESSMENT — PATIENT HEALTH QUESTIONNAIRE - PHQ9: CLINICAL INTERPRETATION OF PHQ2 SCORE: 0
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
## 2018-04-12 NOTE — ASSESSMENT & PLAN NOTE
This is a 32-year-old female who is 5 weeks postpartum. Prior to her previous pregnancy she was being provided Norco for exacerbation of low back pain. She takes medication may be monthly. States that she is an avid weightlifter and strained the back at times. Medications very effective along with Flexeril to help with her back pain. Last prescription was given prior to her pregnancy. She currently is breast-feeding. Also has a pump. No recent exacerbation of low back pain. She is exercising routinely. She is down to 195 from 220+ weight. Would like to continue to lose weight.

## 2018-04-12 NOTE — ASSESSMENT & PLAN NOTE
She complains of a 2 to 3 day history of having migraines. Pain in the temples radiating to the back bilaterally. Associated change in vision with blurred vision. No nausea or vomiting. Took ibuprofen 600 mg. Took Excedrin Migraine. In the past she has had tension headaches.